# Patient Record
Sex: MALE | Race: WHITE | NOT HISPANIC OR LATINO | Employment: FULL TIME | URBAN - METROPOLITAN AREA
[De-identification: names, ages, dates, MRNs, and addresses within clinical notes are randomized per-mention and may not be internally consistent; named-entity substitution may affect disease eponyms.]

---

## 2019-01-14 VITALS
SYSTOLIC BLOOD PRESSURE: 137 MMHG | WEIGHT: 170 LBS | HEART RATE: 65 BPM | DIASTOLIC BLOOD PRESSURE: 84 MMHG | BODY MASS INDEX: 25.76 KG/M2 | HEIGHT: 68 IN

## 2019-01-14 DIAGNOSIS — M22.41 CHONDROMALACIA OF RIGHT PATELLA: Primary | ICD-10-CM

## 2019-01-14 PROCEDURE — 99203 OFFICE O/P NEW LOW 30 MIN: CPT | Performed by: ORTHOPAEDIC SURGERY

## 2019-01-14 NOTE — PATIENT INSTRUCTIONS
1  I explained the natural history of the  problem to the  patient - there is no surgical intervention necessary and should improve with nonoperative treatment  2  Activity modification - avoid hyperflexion like bending, kneeling, squatting,  stairs as  much as humanly possible  Change position frequently to straighten your leg if you are sitting for a long period of time  3  Pain should be the warning guide to your activities - both during and after exercises  Stop doing your sport or activity if you are getting significant pain  4  Ice in large trash bag or bag of frozen peas for 15 min 4x a day, if needed, for pain or swelling  Heat is not indicated  5  Take Advil, Aleve, or Tylenol on an as-needed basis for pain  6  Do the isometric quad and hamstring exercises that you were shown diligently at  home  7  Gliding activities (skiing machine, elliptical, swimming) are allowed, but do not do pounding activities  ( treadmill, aerobics, distance walking )     8  A knee sleeve with patellar cut out can occasionally help in controlling some of the pain

## 2019-01-14 NOTE — PROGRESS NOTES
Chief Complaint   Patient presents with    Right Knee - Pain           Assessment:  Chondromalacia right patellae    Plan :  1  I explained the natural history of the  problem to the  patient - there is no surgical intervention necessary and should improve with nonoperative treatment  2  Activity modification - avoid hyperflexion like bending, kneeling, squatting,  stairs as  much as humanly possible  Change position frequently to straighten your leg if you are sitting for a long period of time  3  Pain should be the warning guide to your activities - both during and after exercises  Stop doing your sport or activity if you are getting significant pain  4  Ice in large trash bag or bag of frozen peas for 15 min 4x a day, if needed, for pain or swelling  Heat is not indicated  5  Take Advil, Aleve, or Tylenol on an as-needed basis for pain  6  Do the isometric quad and hamstring exercises that you were shown diligently at  home  7  Gliding activities (skiing machine, elliptical, swimming) are allowed, but do not do pounding activities  ( treadmill, aerobics, distance walking )  8  A knee sleeve with patellar cut out can occasionally help in controlling some of the pain    HPI:   Patient is a 25-year-old white male who presents today for consultation regarding right knee pain secondary to injury at work that occurred on 12/17/2018  He reports that he was walking down a ramp at work, and can not recall specific TRISH  He felt intense right knee pain at time of injury  Was initially seen at [facility] under the instruction of his employer, x-rays were taken and read as negative  Recalls having decreased knee extension for 3 days following the initial injury  On waking several days after the initial injury, he could spontaneously straighten without pain or complaint  On today's presentation he denies any bruising, swelling, numbness, tingling, or instability   He reports spontaneous onset achy pain in the anteromedial aspect of the knee local to the patella  He also reports occasional (once a week or less) nonpainful clicking of the knee  He is not currently taking any routine medication for pain  PMHx:         History reviewed  No pertinent past medical history  History reviewed  No pertinent surgical history  History reviewed  No pertinent family history  Social History     Social History    Marital status: Single     Spouse name: N/A    Number of children: N/A    Years of education: N/A     Occupational History    Not on file  Social History Main Topics    Smoking status: Never Smoker    Smokeless tobacco: Never Used    Alcohol use Not on file    Drug use: Unknown    Sexual activity: Not on file     Other Topics Concern    Not on file     Social History Narrative    No narrative on file       No current outpatient prescriptions on file  No current facility-administered medications for this visit  Allergies: Erythromycin base; Penicillins; and Sulfamethoxazole-trimethoprim    ROS:  Positive for musculoskeletal complaints as noted above  The remaining 11/12 systems on the intake sheet that I reviewed were negative  PE:  /84   Pulse 65   Ht 5' 8" (1 727 m)   Wt 77 1 kg (170 lb)   BMI 25 85 kg/m²   Constitutional: The patient was  oriented to person, place, and time  Well-developed and well-nourished  In no acute distress  HEENT: Vision intact  Hearing normal  Swallowing normal   Head: Normocephalic  Cardiovascular: Intact distal pulses  Pulse regular  Pulmonary/Chest: Effort normal  No respiratory distress  Neurological: Alert and oriented to person, place, and time  Skin: Skin is warm  Psychiatric: Normal mood and affect  Ortho Exam:   Patient presents with no obvious anatomical deformity  Skin is warm and dry to touch with no signs of erythema, ecchymosis, or infection    No popliteal adenopathy noted on exam   Patient is nontender over medial or lateral joint lines, medial or lateral tibial plateau, medial or lateral femoral condyle  Active and passive ROM +5 to 135 degrees noted bilaterally  He demonstrates laxity with medial stress, equal bilaterally  He demonstrates lateral patellar tracking with active knee extension, noted bilaterally  Negative patellar compression test   No crepitation noted on exam   2+ tibialis posterior pulse with brisk capillary refill noted distally  Sensation to light touch and pinprick intact  Patient is able to demonstrate normal, stable gait pattern without assistive device  Studies reviewed: Attending Physician has personally reviewed pertinent imaging and/or reports in PACS, impression is as follows:    Review of radiographic series taken 12/17/2018 of the right knee shows no signs of osseous abnormalities or malalignment  Joint space is well maintained  There are no lytic areas    And no signs of soft tissue swelling    Scribe Attestation    I,:   Elder Stewart am acting as a scribe while in the presence of the attending physician :        I,:   Shekhar Barajas MD personally performed the services described in this documentation    as scribed in my presence :

## 2019-01-14 NOTE — LETTER
January 14, 2019     Patient: Hector Acosta   YOB: 1985   Date of Visit: 1/14/2019       To Whom it May Concern:    Hector Acosta is under my professional care  He was seen in my office on 1/14/2019  He is cleared to return to work related activities as tolerated without limitation or restriction  Please allow him to use patellar knee sleeve as needed for pain  If you have any questions or concerns, please don't hesitate to call           Sincerely,          Nydia Addison MD        CC: No Recipients